# Patient Record
Sex: FEMALE | Race: OTHER | NOT HISPANIC OR LATINO | URBAN - METROPOLITAN AREA
[De-identification: names, ages, dates, MRNs, and addresses within clinical notes are randomized per-mention and may not be internally consistent; named-entity substitution may affect disease eponyms.]

---

## 2019-10-05 ENCOUNTER — EMERGENCY (EMERGENCY)
Facility: HOSPITAL | Age: 29
LOS: 1 days | Discharge: HOME | End: 2019-10-05
Attending: EMERGENCY MEDICINE | Admitting: EMERGENCY MEDICINE
Payer: COMMERCIAL

## 2019-10-05 VITALS
HEART RATE: 95 BPM | WEIGHT: 123.46 LBS | RESPIRATION RATE: 18 BRPM | HEIGHT: 64.57 IN | DIASTOLIC BLOOD PRESSURE: 79 MMHG | OXYGEN SATURATION: 99 % | TEMPERATURE: 98 F | SYSTOLIC BLOOD PRESSURE: 133 MMHG

## 2019-10-05 VITALS
OXYGEN SATURATION: 98 % | SYSTOLIC BLOOD PRESSURE: 108 MMHG | RESPIRATION RATE: 18 BRPM | HEART RATE: 92 BPM | DIASTOLIC BLOOD PRESSURE: 64 MMHG | TEMPERATURE: 98 F

## 2019-10-05 PROCEDURE — 86901 BLOOD TYPING SEROLOGIC RH(D): CPT

## 2019-10-05 PROCEDURE — 86850 RBC ANTIBODY SCREEN: CPT

## 2019-10-05 PROCEDURE — 86900 BLOOD TYPING SEROLOGIC ABO: CPT

## 2019-10-05 PROCEDURE — 99284 EMERGENCY DEPT VISIT MOD MDM: CPT | Mod: 25

## 2019-10-05 PROCEDURE — 80053 COMPREHEN METABOLIC PANEL: CPT

## 2019-10-05 PROCEDURE — 76817 TRANSVAGINAL US OBSTETRIC: CPT | Mod: 26

## 2019-10-05 PROCEDURE — 84702 CHORIONIC GONADOTROPIN TEST: CPT

## 2019-10-05 PROCEDURE — 76817 TRANSVAGINAL US OBSTETRIC: CPT

## 2019-10-05 PROCEDURE — 85025 COMPLETE CBC W/AUTO DIFF WBC: CPT

## 2019-10-05 PROCEDURE — 99284 EMERGENCY DEPT VISIT MOD MDM: CPT

## 2019-10-05 PROCEDURE — 76801 OB US < 14 WKS SINGLE FETUS: CPT

## 2019-10-05 PROCEDURE — 87086 URINE CULTURE/COLONY COUNT: CPT

## 2019-10-05 PROCEDURE — 36415 COLL VENOUS BLD VENIPUNCTURE: CPT

## 2019-10-05 PROCEDURE — 76801 OB US < 14 WKS SINGLE FETUS: CPT | Mod: 26

## 2019-10-05 NOTE — ED PROVIDER NOTE - PATIENT PORTAL LINK FT
You can access the FollowMyHealth Patient Portal offered by [facility] by registering at the following website: [email or web address]. By joining [facility]’s FollowMyHealth portal, you will also be able to view your health information using other applications (apps) compatible with our system.

## 2019-10-05 NOTE — ED PROVIDER NOTE - NSFOLLOWUPINSTRUCTIONS_ED_ALL_ED_FT
First Trimester Pregnancy    WHAT YOU NEED TO KNOW:    The first trimester of pregnancy lasts from your last period through the 12th week of pregnancy. Follow up with your healthcare providers for prenatal care. Regular prenatal care can help to keep you and your baby healthy.    DISCHARGE INSTRUCTIONS:    Return to the emergency department if:     You have pain or cramping in your abdomen or low back.      You have severe vaginal bleeding or clotting.    Call your doctor or obstetrician if:     You have light bleeding.      You have chills or a fever.      You have vaginal itching, burning, or pain.      You have yellow, green, white, or foul-smelling vaginal discharge.      You have pain or burning when you urinate, less urine than usual, or pink or bloody urine.      You have questions or concerns about your condition or care.    Follow up with your doctor or obstetrician as directed: Go to all of your prenatal visits during your pregnancy. Write down your questions so you remember to ask them during your visits.    Prenatal care: Prenatal care is a series of visits with your healthcare provider throughout your pregnancy. Prenatal care can help prevent problems during pregnancy and childbirth. At each prenatal visit, your healthcare provider will weigh you and check your blood pressure. Your healthcare provider will also check your baby's heartbeat and growth. You may also need the following at some visits:     A pelvic exam allows your healthcare provider to see your cervix (the bottom part of your uterus). Your healthcare provider will use a speculum to open your vagina. He or she will check the size and shape of your uterus.      Blood tests may be done to check for any of the following:   Gestational diabetes or anemia (low iron level)      Blood type or Rh factor, or certain birth defects      Immunity to certain diseases, such as chickenpox or rubella      An infection, such as a sexually transmitted infection, HIV, or hepatitis B      Hepatitis B may need to be prevented or treated. Hepatitis B is inflammation of the liver caused by the hepatitis B virus (HBV). HBV can spread from a mother to her baby during delivery. You will be checked for HBV as early as possible in the first trimester of each pregnancy. You need the test even if you received the hepatitis B vaccine or were tested before. You may need to have an HBV infection treated before you give birth.      Urine tests may also be done to check for sugar and protein. These can be signs of gestational diabetes or preeclampsia. Urine tests may also be done to check for signs of infection.      A fetal ultrasound shows pictures of your baby inside your uterus. The pictures are used to check your baby's development, movement, and position. Your healthcare provider may be able to tell you if you are having a boy or a girl during the ultrasound. This is usually possible at around 18 to 22 weeks.      Genetic disorder screening tests may be offered to you. These screening tests check your baby's risk for genetic disorders such as Down syndrome. A screening test includes a blood test and ultrasound.    Stay healthy during pregnancy:     Take prenatal vitamins as directed. Prenatal vitamins can help you get the amount of vitamins and minerals you need during pregnancy. Prenatal vitamins may also decrease the risk of certain birth defects.       Eat a variety of healthy foods. Healthy foods include fruits, vegetables, whole-grain breads, low-fat dairy products, beans, turkey and chicken, and lean red meat. Ask your healthcare provider for more information about foods that are healthy and safe to eat during pregnancy.      Drink liquids as directed. Ask how much liquid to drink each day and which liquids are best for you. Some healthy liquids include milk, water, and juice. It is not clear how caffeine affects pregnancy. Limit your intake of caffeine to less than 200 mg each day to avoid possible health problems. Caffeine may be found in coffee, tea, cola, sports drinks, and chocolate. Do not drink alcohol.       Talk to your healthcare provider before you take medicines. Many medicines can harm your baby, especially during early pregnancy. Ask your healthcare provider before you take any medicines, including over-the-counter medicines, vitamins, herbs, or food supplements. Never use illegal or street drugs while you are pregnant. Talk to your healthcare provider if you are having trouble quitting street drugs.      Exercise as directed. Ask your healthcare provider about the best exercise plan for you. Exercise may help you feel better and make your labor and delivery easier.      Do not smoke. Smoking increases your risk of a miscarriage and other health problems during your pregnancy. Smoking can cause your baby to be born too early or weigh less at birth. Quit smoking as soon as you think you might be pregnant. Ask your healthcare provider for information if you need help quitting.    Safety tips:     Do not use a hot tub or sauna while you are pregnant, especially during your first trimester. Hot tubs and saunas may raise your baby's temperature and increase the risk of birth defects. It also increases your risk of miscarriage.      Protect yourself from illness. Toxoplasmosis is a disease that can cause birth defects. To protect yourself from this disease, do not clean your cat's litter box yourself. Ask someone else to clean your cat's litter box while you are pregnant. Also, do not eat raw meat or unwashed fruits and vegetables. Wash your hands after you touch raw meat, and eat only well-cooked meat. Wash fruits and vegetables well before you eat them.

## 2019-10-05 NOTE — ED ADULT TRIAGE NOTE - CHIEF COMPLAINT QUOTE
Patient 5 weeks pregnant c/o LLQ abdominal pain radiating to back since yesterday . Denies any vaginal bleeding .  .

## 2019-10-05 NOTE — ED ADULT NURSE NOTE - OBJECTIVE STATEMENT
Patient c/o of abdominal cramping pain, radiates to back , G1PO, no nausea/vomitting, no vaginal bleed/dysuria, states symptoms started yesterday.

## 2019-10-05 NOTE — ED ADULT NURSE NOTE - CHPI ED NUR SYMPTOMS NEG
no abdominal distension/no burning urination/no dysuria/no hematuria/no vomiting/no blood in stool/no chills/no nausea/no diarrhea/no fever

## 2019-10-05 NOTE — ED ADULT NURSE REASSESSMENT NOTE - NS ED NURSE REASSESS COMMENT FT1
Patient c/o of intemittent abdominal pain, currently resolved, no vaginal bleed/spotting.  Vital signs stable.  All labs , US done.  Results and disposition pending.

## 2019-10-05 NOTE — ED PROVIDER NOTE - ATTENDING CONTRIBUTION TO CARE
here with bleeding a few days ago and cramping today.  vitals stable.  us done showing iup.  rh+.  plan f/u outpatient for threatened

## 2019-10-05 NOTE — ED PROVIDER NOTE - OBJECTIVE STATEMENT
28 y/o F  5 weeks pregnant LMP  presents to the ED with lower abdominal pain radiating to her back since yesterday. Pt reports vaginal spotting a few days ago, but has since resolved; no OB visit yet. Denies nausea, vomiting, dysuria, hematuria, fever, or prior abdominal surgeries.

## 2019-10-05 NOTE — ED PROVIDER NOTE - CLINICAL SUMMARY MEDICAL DECISION MAKING FREE TEXT BOX
30 y/o F  5 weeks pregnant presents with L lower pelvic pain. Will check labs, urine, and ultrasound to r/o ectopic.

## 2019-10-10 DIAGNOSIS — Z3A.01 LESS THAN 8 WEEKS GESTATION OF PREGNANCY: ICD-10-CM

## 2019-10-10 DIAGNOSIS — O26.891 OTHER SPECIFIED PREGNANCY RELATED CONDITIONS, FIRST TRIMESTER: ICD-10-CM

## 2019-10-10 DIAGNOSIS — O20.0 THREATENED ABORTION: ICD-10-CM

## 2019-10-14 ENCOUNTER — EMERGENCY (EMERGENCY)
Facility: HOSPITAL | Age: 29
LOS: 1 days | Discharge: HOME | End: 2019-10-14
Attending: EMERGENCY MEDICINE | Admitting: EMERGENCY MEDICINE
Payer: MEDICAID

## 2019-10-14 VITALS
SYSTOLIC BLOOD PRESSURE: 105 MMHG | OXYGEN SATURATION: 98 % | DIASTOLIC BLOOD PRESSURE: 71 MMHG | TEMPERATURE: 98 F | HEART RATE: 84 BPM | RESPIRATION RATE: 18 BRPM

## 2019-10-14 VITALS
SYSTOLIC BLOOD PRESSURE: 110 MMHG | HEART RATE: 79 BPM | OXYGEN SATURATION: 100 % | TEMPERATURE: 99 F | WEIGHT: 123.46 LBS | RESPIRATION RATE: 17 BRPM | DIASTOLIC BLOOD PRESSURE: 69 MMHG

## 2019-10-14 LAB
ALBUMIN SERPL ELPH-MCNC: 4.6 G/DL — SIGNIFICANT CHANGE UP (ref 3.3–5)
ALP SERPL-CCNC: 42 U/L — SIGNIFICANT CHANGE UP (ref 40–120)
ALT FLD-CCNC: 11 U/L — SIGNIFICANT CHANGE UP (ref 10–45)
ANION GAP SERPL CALC-SCNC: 11 MMOL/L — SIGNIFICANT CHANGE UP (ref 5–17)
APPEARANCE UR: CLEAR — SIGNIFICANT CHANGE UP
AST SERPL-CCNC: 15 U/L — SIGNIFICANT CHANGE UP (ref 10–40)
BASOPHILS # BLD AUTO: 0.05 K/UL — SIGNIFICANT CHANGE UP (ref 0–0.2)
BASOPHILS NFR BLD AUTO: 0.5 % — SIGNIFICANT CHANGE UP (ref 0–2)
BILIRUB SERPL-MCNC: 0.3 MG/DL — SIGNIFICANT CHANGE UP (ref 0.2–1.2)
BILIRUB UR-MCNC: NEGATIVE — SIGNIFICANT CHANGE UP
BLD GP AB SCN SERPL QL: NEGATIVE — SIGNIFICANT CHANGE UP
BUN SERPL-MCNC: 8 MG/DL — SIGNIFICANT CHANGE UP (ref 7–23)
CALCIUM SERPL-MCNC: 9.8 MG/DL — SIGNIFICANT CHANGE UP (ref 8.4–10.5)
CHLORIDE SERPL-SCNC: 103 MMOL/L — SIGNIFICANT CHANGE UP (ref 96–108)
CO2 SERPL-SCNC: 23 MMOL/L — SIGNIFICANT CHANGE UP (ref 22–31)
COLOR SPEC: YELLOW — SIGNIFICANT CHANGE UP
CREAT SERPL-MCNC: 0.55 MG/DL — SIGNIFICANT CHANGE UP (ref 0.5–1.3)
DIFF PNL FLD: NEGATIVE — SIGNIFICANT CHANGE UP
EOSINOPHIL # BLD AUTO: 0.13 K/UL — SIGNIFICANT CHANGE UP (ref 0–0.5)
EOSINOPHIL NFR BLD AUTO: 1.2 % — SIGNIFICANT CHANGE UP (ref 0–6)
GLUCOSE SERPL-MCNC: 90 MG/DL — SIGNIFICANT CHANGE UP (ref 70–99)
GLUCOSE UR QL: NEGATIVE — SIGNIFICANT CHANGE UP
HCG SERPL-ACNC: HIGH MIU/ML
HCG SERPL-ACNC: HIGH MIU/ML
HCT VFR BLD CALC: 37 % — SIGNIFICANT CHANGE UP (ref 34.5–45)
HGB BLD-MCNC: 12.1 G/DL — SIGNIFICANT CHANGE UP (ref 11.5–15.5)
IMM GRANULOCYTES NFR BLD AUTO: 0.2 % — SIGNIFICANT CHANGE UP (ref 0–1.5)
KETONES UR-MCNC: NEGATIVE — SIGNIFICANT CHANGE UP
LEUKOCYTE ESTERASE UR-ACNC: NEGATIVE — SIGNIFICANT CHANGE UP
LYMPHOCYTES # BLD AUTO: 3.96 K/UL — HIGH (ref 1–3.3)
LYMPHOCYTES # BLD AUTO: 37.3 % — SIGNIFICANT CHANGE UP (ref 13–44)
MCHC RBC-ENTMCNC: 28.9 PG — SIGNIFICANT CHANGE UP (ref 27–34)
MCHC RBC-ENTMCNC: 32.7 GM/DL — SIGNIFICANT CHANGE UP (ref 32–36)
MCV RBC AUTO: 88.3 FL — SIGNIFICANT CHANGE UP (ref 80–100)
MONOCYTES # BLD AUTO: 0.77 K/UL — SIGNIFICANT CHANGE UP (ref 0–0.9)
MONOCYTES NFR BLD AUTO: 7.2 % — SIGNIFICANT CHANGE UP (ref 2–14)
NEUTROPHILS # BLD AUTO: 5.7 K/UL — SIGNIFICANT CHANGE UP (ref 1.8–7.4)
NEUTROPHILS NFR BLD AUTO: 53.6 % — SIGNIFICANT CHANGE UP (ref 43–77)
NITRITE UR-MCNC: NEGATIVE — SIGNIFICANT CHANGE UP
NRBC # BLD: 0 /100 WBCS — SIGNIFICANT CHANGE UP (ref 0–0)
PH UR: 6 — SIGNIFICANT CHANGE UP (ref 5–8)
PLATELET # BLD AUTO: 248 K/UL — SIGNIFICANT CHANGE UP (ref 150–400)
POTASSIUM SERPL-MCNC: 4.1 MMOL/L — SIGNIFICANT CHANGE UP (ref 3.5–5.3)
POTASSIUM SERPL-SCNC: 4.1 MMOL/L — SIGNIFICANT CHANGE UP (ref 3.5–5.3)
PROT SERPL-MCNC: 7.7 G/DL — SIGNIFICANT CHANGE UP (ref 6–8.3)
PROT UR-MCNC: NEGATIVE MG/DL — SIGNIFICANT CHANGE UP
RBC # BLD: 4.19 M/UL — SIGNIFICANT CHANGE UP (ref 3.8–5.2)
RBC # FLD: 12.9 % — SIGNIFICANT CHANGE UP (ref 10.3–14.5)
RH IG SCN BLD-IMP: POSITIVE — SIGNIFICANT CHANGE UP
SODIUM SERPL-SCNC: 137 MMOL/L — SIGNIFICANT CHANGE UP (ref 135–145)
SP GR SPEC: 1.01 — SIGNIFICANT CHANGE UP (ref 1–1.03)
UROBILINOGEN FLD QL: 0.2 E.U./DL — SIGNIFICANT CHANGE UP
WBC # BLD: 10.63 K/UL — HIGH (ref 3.8–10.5)
WBC # FLD AUTO: 10.63 K/UL — HIGH (ref 3.8–10.5)

## 2019-10-14 PROCEDURE — 99284 EMERGENCY DEPT VISIT MOD MDM: CPT | Mod: 25

## 2019-10-14 PROCEDURE — 85025 COMPLETE CBC W/AUTO DIFF WBC: CPT

## 2019-10-14 PROCEDURE — 86900 BLOOD TYPING SEROLOGIC ABO: CPT

## 2019-10-14 PROCEDURE — 76801 OB US < 14 WKS SINGLE FETUS: CPT | Mod: 26

## 2019-10-14 PROCEDURE — 87086 URINE CULTURE/COLONY COUNT: CPT

## 2019-10-14 PROCEDURE — 84702 CHORIONIC GONADOTROPIN TEST: CPT

## 2019-10-14 PROCEDURE — 80053 COMPREHEN METABOLIC PANEL: CPT

## 2019-10-14 PROCEDURE — 99284 EMERGENCY DEPT VISIT MOD MDM: CPT

## 2019-10-14 PROCEDURE — 86901 BLOOD TYPING SEROLOGIC RH(D): CPT

## 2019-10-14 PROCEDURE — 76817 TRANSVAGINAL US OBSTETRIC: CPT

## 2019-10-14 PROCEDURE — 36415 COLL VENOUS BLD VENIPUNCTURE: CPT

## 2019-10-14 PROCEDURE — 86850 RBC ANTIBODY SCREEN: CPT

## 2019-10-14 PROCEDURE — 81003 URINALYSIS AUTO W/O SCOPE: CPT

## 2019-10-14 PROCEDURE — 76817 TRANSVAGINAL US OBSTETRIC: CPT | Mod: 26

## 2019-10-14 PROCEDURE — 76801 OB US < 14 WKS SINGLE FETUS: CPT

## 2019-10-14 RX ORDER — SODIUM CHLORIDE 9 MG/ML
3 INJECTION INTRAMUSCULAR; INTRAVENOUS; SUBCUTANEOUS ONCE
Refills: 0 | Status: COMPLETED | OUTPATIENT
Start: 2019-10-14 | End: 2019-10-14

## 2019-10-14 RX ADMIN — SODIUM CHLORIDE 3 MILLILITER(S): 9 INJECTION INTRAMUSCULAR; INTRAVENOUS; SUBCUTANEOUS at 19:10

## 2019-10-14 NOTE — ED PROVIDER NOTE - PATIENT PORTAL LINK FT
You can access the FollowMyHealth Patient Portal offered by [facility] by registering at the following website: [email or web address]. By joining [email or web address]’s FollowMyHealth portal, you will also be able to view your health information using other applications (apps) compatible with our system.

## 2019-10-14 NOTE — ED PROVIDER NOTE - NSFOLLOWUPCLINICS_GEN_ALL_ED_FT
[facility] - Emergency Department  Emergency Medicine  [address] [place] [address]  Phone: [phone]  Fax:   Follow Up Time: 1-3 Days

## 2019-10-14 NOTE — ED PROVIDER NOTE - CLINICAL SUMMARY MEDICAL DECISION MAKING FREE TEXT BOX
pregnancy  vag spotting may dc  seen by gyn ok to fly unless bleeding 28 yo F   pos IUP 7 weeks pregnancy small sub chorionic  ok to fly if not bleeding per GYN . pelvic rest  rec no sexual IC

## 2019-10-14 NOTE — ED ADULT TRIAGE NOTE - ARRIVAL INFO ADDITIONAL COMMENTS
Pt walked into ED with c/o of vaginal spotting and is 6 weeks pregnant and cramps. onset was 2 days ago stopped and started back today. Denies fever, chills. First pregnancy with no complications.

## 2019-10-14 NOTE — ED PROVIDER NOTE - OBJECTIVE STATEMENT
30 yo F   (pos IUP) approx 6-7 weeks preg  with light vag spotting x 2-3 days recently seen and eval x 2 times in ED- visiting from [place] over the past month-- no fevers or chills no N/V no diarrhea kathy po well

## 2019-10-14 NOTE — CONSULT NOTE ADULT - SUBJECTIVE AND OBJECTIVE BOX
29y  LMP  7w1d presenting with 1 day of vaginal spotting. Bleeding is red, light, only a few spots, not bleeding currently. Was seen a couple weeks ago for same issue but was bleeding heavier then, then bleeding subsided. Denies abdominal pain, feeling dizzy, lightheaded, sob, fevers, chills. Has a flight back to [place] on Wednesday at 1300.     OBHx: current  Gyn H/x:  LMP:   H/x of cysts, fibroids, endometriosis: denies  STIs: denies h/x of HIV, RPR, Hepatitis, G/C, Trich  GYN Physician: in [place]   PMH: denies  PSH: denies  Meds: none  NKDA       T(C): 36.9 (10-14-19 @ 21:25), Max: 37 (10-14-19 @ 18:31)  HR: 84 (10-14-19 @ 21:25) (79 - 84)  BP: 105/71 (10-14-19 @ 21:25) (105/71 - 110/69)  RR: 18 (10-14-19 @ 21:25) (17 - 18)  SpO2: 98% (10-14-19 @ 21:25) (98% - 100%)    PHYSICAL EXAM:   Gen: NAD  GI: soft, nontender, nondistended + BS, no rebound no guarding  : normal external genitalia   BME: no adnexal or fundal tenderness   Spec: cervix closed, no abnormal discharge/vaginal bleeding  Ext: wnl        LABS:                        12.1   10.63 )-----------( 248      ( 14 Oct 2019 19:11 )             37.0     10-    137  |  103  |  8   ----------------------------<  90  4.1   |  23  |  0.55    Ca    9.8      14 Oct 2019 19:10    TPro  7.7  /  Alb  4.6  /  TBili  0.3  /  DBili  x   /  AST  15  /  ALT  11  /  AlkPhos  42  10-      Urinalysis Basic - ( 14 Oct 2019 20:49 )    Color: Yellow / Appearance: Clear / S.010 / pH: x  Gluc: x / Ketone: NEGATIVE  / Bili: Negative / Urobili: 0.2 E.U./dL   Blood: x / Protein: NEGATIVE mg/dL / Nitrite: NEGATIVE   Leuk Esterase: NEGATIVE / RBC: x / WBC x   Sq Epi: x / Non Sq Epi: x / Bacteria: x      RADIOLOGY & ADDITIONAL STUDIES:  < from: US Transvaginal, OB (10.14.19 @ 20:13) >  IMPRESSION:   Single live intrauterine gestation with estimated age of 6 weeks 6 days.   Small-moderate subchorionic hemorrhage.      < end of copied text >

## 2019-10-14 NOTE — ED ADULT NURSE NOTE - OBJECTIVE STATEMENT
Pt. is 6 weeks pregnant, c/o on/off vaginal spotting x 2 days. Pt. reports mild lower abd cramping and nausea throughout pregnancy.

## 2019-10-14 NOTE — CONSULT NOTE ADULT - ASSESSMENT
29y  LMP  7w1d presenting with 1 day of vaginal spotting, found to have small to moderate subchorionic bleed  -pt hemodynamically stable, normal hgb   -stable for discharge   -safe for pt to take flight on Wednesday as long as not bleeding heavily  -told to return to ED with heavy bleeding or severe abdominal pain   -pelvic rest  -will f/u with OBGERARDON in [place]    D/w  Or

## 2019-10-15 PROBLEM — Z78.9 OTHER SPECIFIED HEALTH STATUS: Chronic | Status: ACTIVE | Noted: 2019-10-05

## 2019-10-15 LAB
CULTURE RESULTS: SIGNIFICANT CHANGE UP
SPECIMEN SOURCE: SIGNIFICANT CHANGE UP

## 2019-10-19 DIAGNOSIS — O26.851 SPOTTING COMPLICATING PREGNANCY, FIRST TRIMESTER: ICD-10-CM

## 2019-10-19 DIAGNOSIS — R10.32 LEFT LOWER QUADRANT PAIN: ICD-10-CM

## 2019-10-19 DIAGNOSIS — Z88.1 ALLERGY STATUS TO OTHER ANTIBIOTIC AGENTS STATUS: ICD-10-CM

## 2019-10-19 DIAGNOSIS — Z3A.01 LESS THAN 8 WEEKS GESTATION OF PREGNANCY: ICD-10-CM

## 2023-04-26 NOTE — ED PROVIDER NOTE - DISPOSITION TYPE
Chief Complaint  Back Pain    Subjective    Liliana Quevedo is a 59 y.o. female.     Liliana Quevedo presents to [facility] for    History of Present Illness     The patient had a stomach virus. She had a urinalysis, and it was sent for culture. She does have a UTI.  Mild dysuria, urgency, frequency, but no fever     + Mild left-sided back pain to deep palpation    The following portions of the patient's history were reviewed and updated as appropriate: allergies, current medications, past family history, past medical history, past social history, past surgical history and problem list.     Review of Systems:  A review of systems was performed, and pertinent findings are noted in the HPI.    Objective   Vital Signs:   /70 (BP Location: Right arm, Patient Position: Sitting, Cuff Size: Adult)   Pulse 57   Temp 97.7 °F (36.5 °C) (Temporal)   Resp 20   Wt 108 kg (239 lb)   BMI 39.77 kg/m²     Body mass index is 39.77 kg/m².    Physical Exam  Constitutional:       General: She is not in acute distress.  Musculoskeletal:      Comments: Very focused physical here on musculoskeletal, specifically on the left costal a CVA, costovertebral area. She does have some soreness in this particular area under the left CVA area, which would be localized to the left kidney, but no rebound or peritoneal signs. With review of patient's urine culture, it shows 25,000 CFU's E. coli, which is resistant to ampicillin and resistant to Bactrim, so based on culture and sensitivities and physical exam today, is consistent more with a left pyelonephritis    Neurological:      Mental Status: She is alert and oriented to person, place, and time.               Assessment and Plan  Diagnoses and all orders for this visit:    1. Left pyelonephritis.  - I am going to put her on a third generation cephalosporin susceptible to that particular class, and also would offer good penetrance and  adequate treatment for kidneys, kidney infection. She will be prescribed Omnicef 300 mg 1 tablet by mouth twice a day for 10 day duration. Complete antibiotic until complete and encourage fluids for hydration. I will have her come back in 2 to 3 weeks just to have her repeat a urinalysis and then see how her symptoms are doing.     Otherwise, everything else looks good. I will see her back in the 3-week follow-up.        Follow Up   Return in about 3 weeks (around 5/17/2023).  Patient was given instructions and counseling regarding her condition or for health maintenance advice. Please see specific information pulled into the AVS if appropriate.       Transcribed from ambient dictation for Geremias Ramesh MD by Jada Gonsalez.  04/26/23   18:56 EDT    Patient or patient representative verbalized consent to the visit recording.  I have personally performed the services described in this document as transcribed by the above individual, and it is both accurate and complete.     DISCHARGE
